# Patient Record
Sex: FEMALE | URBAN - METROPOLITAN AREA
[De-identification: names, ages, dates, MRNs, and addresses within clinical notes are randomized per-mention and may not be internally consistent; named-entity substitution may affect disease eponyms.]

---

## 2022-11-29 ENCOUNTER — OFFICE (OUTPATIENT)
Dept: URBAN - METROPOLITAN AREA CLINIC 122 | Facility: CLINIC | Age: 72
Setting detail: OPHTHALMOLOGY
End: 2022-11-29
Payer: MEDICARE

## 2022-11-29 DIAGNOSIS — H10.45: ICD-10-CM

## 2022-11-29 DIAGNOSIS — H44.23: ICD-10-CM

## 2022-11-29 DIAGNOSIS — H25.13: ICD-10-CM

## 2022-11-29 DIAGNOSIS — H01.002: ICD-10-CM

## 2022-11-29 DIAGNOSIS — H16.222: ICD-10-CM

## 2022-11-29 DIAGNOSIS — H25.013: ICD-10-CM

## 2022-11-29 DIAGNOSIS — H01.005: ICD-10-CM

## 2022-11-29 DIAGNOSIS — H43.393: ICD-10-CM

## 2022-11-29 DIAGNOSIS — H02.89: ICD-10-CM

## 2022-11-29 PROCEDURE — 99213 OFFICE O/P EST LOW 20 MIN: CPT | Performed by: OPHTHALMOLOGY

## 2022-11-29 ASSESSMENT — REFRACTION_CURRENTRX
OS_SPHERE: -16.25
OD_SPHERE: -17.00
OS_AXIS: 100
OD_AXIS: 50
OD_CYLINDER: -1.75
OD_OVR_VA: 20/
OS_CYLINDER: -3.25
OS_OVR_VA: 20/

## 2022-11-29 ASSESSMENT — OVER_REFRACTION
OD_VA1: 20/60-
OD_SPHERE: PLANO
OD_SPHERE: -0.75
OS_SPHERE: -0.75
OS_VA1: 20/50-
OS_SPHERE: PLANO
OD_VA1: 20/70
OS_VA1: 20/70

## 2022-11-29 ASSESSMENT — CORNEAL TRAUMA - FOREIGN BODY
OD_FOREIGNBODY: ABSENT
OS_FOREIGNBODY: ABSENT

## 2022-11-29 ASSESSMENT — LID EXAM ASSESSMENTS
OD_BLEPHARITIS: RLL 2+
OS_TRICHIASIS: LLL 1+
OS_BLEPHARITIS: LLL 2+

## 2022-11-29 ASSESSMENT — SUPERFICIAL PUNCTATE KERATITIS (SPK): OS_SPK: 1+

## 2022-11-29 ASSESSMENT — TEAR BREAK UP TIME (TBUT): OS_TBUT: 2+

## 2022-11-29 ASSESSMENT — CONFRONTATIONAL VISUAL FIELD TEST (CVF)
OS_FINDINGS: FULL
OD_FINDINGS: FULL

## 2022-11-29 ASSESSMENT — VISUAL ACUITY
OS_BCVA: 20/150
OD_BCVA: 20/50+-

## 2022-11-29 ASSESSMENT — CORNEAL TRAUMA - ABRASION: OS_ABRASION: ABSENT

## 2023-02-17 ENCOUNTER — APPOINTMENT (OUTPATIENT)
Dept: VASCULAR SURGERY | Facility: CLINIC | Age: 73
End: 2023-02-17
Payer: MEDICARE

## 2023-02-17 VITALS
HEIGHT: 59 IN | HEART RATE: 76 BPM | DIASTOLIC BLOOD PRESSURE: 75 MMHG | BODY MASS INDEX: 20.36 KG/M2 | OXYGEN SATURATION: 98 % | TEMPERATURE: 97.3 F | SYSTOLIC BLOOD PRESSURE: 142 MMHG | WEIGHT: 101 LBS | RESPIRATION RATE: 16 BRPM

## 2023-02-17 PROBLEM — Z00.00 ENCOUNTER FOR PREVENTIVE HEALTH EXAMINATION: Status: ACTIVE | Noted: 2023-02-17

## 2023-02-17 PROCEDURE — 99204 OFFICE O/P NEW MOD 45 MIN: CPT

## 2023-02-20 RX ORDER — STRONTIUM CHLORIDE HEXAHYDRATE 100 %
CRYSTALS MISCELLANEOUS
Refills: 0 | Status: ACTIVE | COMMUNITY

## 2023-02-20 RX ORDER — GLUCOSAMINE/MSM/CHONDROIT SULF 500-166.6
20 TABLET ORAL
Refills: 0 | Status: ACTIVE | COMMUNITY

## 2023-02-20 RX ORDER — ACETAMINOPHEN 325 MG
TABLET ORAL
Refills: 0 | Status: ACTIVE | COMMUNITY

## 2023-02-20 RX ORDER — MULTIVIT-MIN/FOLIC/VIT K/LYCOP 400-300MCG
1000 TABLET ORAL
Refills: 0 | Status: ACTIVE | COMMUNITY

## 2023-02-20 NOTE — HISTORY OF PRESENT ILLNESS
[FreeTextEntry1] : 72 yo female presents to the office for a chief complaint of pain in her left lower extremity associated with varicose veins. She reports that she is s/p multiple venous procedures by Dr. Harris. She reports that she underwent a venous duplex in his office and was scheduled to undergo an additional venous procedure. She presents for a 2nd opinion. She denies a history of DVT or SVT.

## 2023-02-20 NOTE — PHYSICAL EXAM
[JVD] : no jugular venous distention  [Normal Breath Sounds] : Normal breath sounds [Normal Rate and Rhythm] : normal rate and rhythm [2+] : left 2+ [Ankle Swelling (On Exam)] : present [Ankle Swelling On The Right] : mild [Varicose Veins Of Lower Extremities] : present [Varicose Veins Of The Left Leg] : of the left leg [Ankle Swelling On The Left] : moderate [] : bilaterally [Alert] : alert [Oriented to Person] : oriented to person [Oriented to Place] : oriented to place [Oriented to Time] : oriented to time [de-identified] : Awake and Alert [de-identified] : varicose veins left thigh > 3mm [de-identified] : Appropriate

## 2023-02-20 NOTE — ASSESSMENT
[FreeTextEntry1] : 72 yo female with a history of venous insufficiency. She continues to have symptomatic varicose veins of the left medial thigh. She underwent a venous duplex which demonstrated venous insufficiency of her remnant GSV. I agree with Dr. Harris that she is an excellent candidate to undergo a Varithena ablation of her remnant GSV. I discussed the risks and benefits with the patient. She will contact the office if she would like to porceed.

## 2023-05-23 ENCOUNTER — OFFICE (OUTPATIENT)
Dept: URBAN - METROPOLITAN AREA CLINIC 122 | Facility: CLINIC | Age: 73
Setting detail: OPHTHALMOLOGY
End: 2023-05-23
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
## 2023-05-23 PROCEDURE — NO SHOW FE NO SHOW FEE: Performed by: OPHTHALMOLOGY

## 2023-08-04 ENCOUNTER — APPOINTMENT (OUTPATIENT)
Dept: VASCULAR SURGERY | Facility: CLINIC | Age: 73
End: 2023-08-04
Payer: MEDICARE

## 2023-08-04 VITALS
SYSTOLIC BLOOD PRESSURE: 120 MMHG | BODY MASS INDEX: 19.76 KG/M2 | HEIGHT: 59 IN | OXYGEN SATURATION: 94 % | RESPIRATION RATE: 15 BRPM | DIASTOLIC BLOOD PRESSURE: 70 MMHG | WEIGHT: 98 LBS | HEART RATE: 63 BPM

## 2023-08-04 PROCEDURE — 99213 OFFICE O/P EST LOW 20 MIN: CPT

## 2023-08-04 NOTE — ASSESSMENT
[FreeTextEntry1] : 72 yo female with a history of venous insufficiency. She has worsening symptomatic varicose veins of the left medial thigh. I recommended that she undergo a repeat venous duplex. She will follow up when the results of the duplex are available. Further treatment will depend on the results of the duplex.

## 2023-08-04 NOTE — PHYSICAL EXAM
[JVD] : no jugular venous distention  [Normal Breath Sounds] : Normal breath sounds [Normal Rate and Rhythm] : normal rate and rhythm [2+] : left 2+ [Ankle Swelling (On Exam)] : present [Ankle Swelling On The Right] : mild [Varicose Veins Of Lower Extremities] : present [Varicose Veins Of The Left Leg] : of the left leg [Ankle Swelling On The Left] : moderate [] : bilaterally [Alert] : alert [Oriented to Person] : oriented to person [Oriented to Place] : oriented to place [Oriented to Time] : oriented to time [de-identified] : Awake and Alert [de-identified] : Appropriate [de-identified] : varicose veins left thigh > 3mm

## 2023-08-04 NOTE — HISTORY OF PRESENT ILLNESS
[FreeTextEntry1] : 74 yo female presents to the office for a chief complaint of pain in her left lower extremity associated with varicose veins. She reports that she is s/p multiple venous procedures by Dr. Harris. She reports that she underwent a venous duplex in his office and was scheduled to undergo an additional venous procedure. She presents for a 2nd opinion. She denies a history of DVT or SVT.  [de-identified] : 72 yo female returns in follow up for a chief complaint of pain in her left lower extremity associated with varicose veins. She  is s/p multiple venous procedures by Dr. Harris. She underwent a venous duplex in his office and was scheduled to undergo an additional venous procedure. She was seen for a second opinion and I agreed with his assessment. At that time she did not feel like her symptoms were significant enough to undergo a repeat procedure. She reports worsening symptoms and would like to discuss treatment options.

## 2023-08-11 ENCOUNTER — APPOINTMENT (OUTPATIENT)
Dept: HEART AND VASCULAR | Facility: CLINIC | Age: 73
End: 2023-08-11
Payer: MEDICARE

## 2023-08-11 PROCEDURE — 93971 EXTREMITY STUDY: CPT

## 2023-08-31 ENCOUNTER — APPOINTMENT (OUTPATIENT)
Dept: DERMATOLOGY | Facility: CLINIC | Age: 73
End: 2023-08-31

## 2023-10-06 ENCOUNTER — APPOINTMENT (OUTPATIENT)
Dept: VASCULAR SURGERY | Facility: CLINIC | Age: 73
End: 2023-10-06
Payer: MEDICARE

## 2023-10-06 VITALS
RESPIRATION RATE: 16 BRPM | HEIGHT: 59 IN | WEIGHT: 98 LBS | DIASTOLIC BLOOD PRESSURE: 80 MMHG | OXYGEN SATURATION: 98 % | SYSTOLIC BLOOD PRESSURE: 120 MMHG | HEART RATE: 68 BPM | BODY MASS INDEX: 19.76 KG/M2

## 2023-10-06 DIAGNOSIS — I87.2 VENOUS INSUFFICIENCY (CHRONIC) (PERIPHERAL): ICD-10-CM

## 2023-10-06 DIAGNOSIS — I83.90 ASYMPTOMATIC VARICOSE VEINS OF UNSPECIFIED LOWER EXTREMITY: ICD-10-CM

## 2023-10-06 PROCEDURE — 99213 OFFICE O/P EST LOW 20 MIN: CPT

## 2023-10-24 ENCOUNTER — OFFICE (OUTPATIENT)
Dept: URBAN - METROPOLITAN AREA CLINIC 122 | Facility: CLINIC | Age: 73
Setting detail: OPHTHALMOLOGY
End: 2023-10-24
Payer: MEDICARE

## 2023-10-24 DIAGNOSIS — H01.005: ICD-10-CM

## 2023-10-24 DIAGNOSIS — H44.23: ICD-10-CM

## 2023-10-24 DIAGNOSIS — H02.015: ICD-10-CM

## 2023-10-24 DIAGNOSIS — H01.002: ICD-10-CM

## 2023-10-24 DIAGNOSIS — H25.13: ICD-10-CM

## 2023-10-24 PROCEDURE — 67820 REVISE EYELASHES: CPT | Mod: E2 | Performed by: OPHTHALMOLOGY

## 2023-10-24 PROCEDURE — 92250 FUNDUS PHOTOGRAPHY W/I&R: CPT | Performed by: OPHTHALMOLOGY

## 2023-10-24 PROCEDURE — 92014 COMPRE OPH EXAM EST PT 1/>: CPT | Mod: 25 | Performed by: OPHTHALMOLOGY

## 2023-10-24 ASSESSMENT — OVER_REFRACTION
OD_SPHERE: PLANO
OD_SPHERE: -0.75
OS_SPHERE: PLANO
OD_VA1: 20/70
OS_VA1: 20/70
OD_VA1: 20/200
OS_VA1: 20/60
OS_SPHERE: -0.75

## 2023-10-24 ASSESSMENT — TEAR BREAK UP TIME (TBUT): OS_TBUT: 2+

## 2023-10-24 ASSESSMENT — LID EXAM ASSESSMENTS
OD_BLEPHARITIS: RLL 2+
OS_TRICHIASIS: LLL 1+
OS_BLEPHARITIS: LLL 2+

## 2023-10-24 ASSESSMENT — VISUAL ACUITY
OD_BCVA: 20/60
OS_BCVA: 20/200

## 2023-10-24 ASSESSMENT — REFRACTION_CURRENTRX
OD_OVR_VA: 20/
OD_SPHERE: -17.00
OD_CYLINDER: -1.75
OS_OVR_VA: 20/
OS_CYLINDER: -3.25
OS_VPRISM_DIRECTION: SV
OS_SPHERE: -16.25
OS_AXIS: 100
OD_AXIS: 50
OD_VPRISM_DIRECTION: SV

## 2023-10-24 ASSESSMENT — CORNEAL TRAUMA - ABRASION: OS_ABRASION: ABSENT

## 2023-10-24 ASSESSMENT — CORNEAL TRAUMA - FOREIGN BODY
OD_FOREIGNBODY: ABSENT
OS_FOREIGNBODY: ABSENT

## 2023-10-24 ASSESSMENT — SUPERFICIAL PUNCTATE KERATITIS (SPK): OS_SPK: 1+

## 2024-05-17 ENCOUNTER — APPOINTMENT (OUTPATIENT)
Dept: VASCULAR SURGERY | Facility: CLINIC | Age: 74
End: 2024-05-17

## 2024-08-29 ENCOUNTER — NON-APPOINTMENT (OUTPATIENT)
Age: 74
End: 2024-08-29

## 2024-08-30 ENCOUNTER — APPOINTMENT (OUTPATIENT)
Dept: VASCULAR SURGERY | Facility: CLINIC | Age: 74
End: 2024-08-30
Payer: MEDICARE

## 2024-08-30 VITALS — HEIGHT: 59 IN | BODY MASS INDEX: 20.36 KG/M2 | WEIGHT: 101 LBS

## 2024-08-30 DIAGNOSIS — I83.90 ASYMPTOMATIC VARICOSE VEINS OF UNSPECIFIED LOWER EXTREMITY: ICD-10-CM

## 2024-08-30 PROCEDURE — 99214 OFFICE O/P EST MOD 30 MIN: CPT

## 2024-08-30 NOTE — PHYSICAL EXAM
[JVD] : no jugular venous distention  [Normal Breath Sounds] : Normal breath sounds [Normal Rate and Rhythm] : normal rate and rhythm [2+] : left 2+ [Ankle Swelling (On Exam)] : present [Ankle Swelling On The Right] : mild [Varicose Veins Of Lower Extremities] : present [Varicose Veins Of The Left Leg] : of the left leg [Ankle Swelling On The Left] : moderate [] : bilaterally [Alert] : alert [Oriented to Person] : oriented to person [Oriented to Place] : oriented to place [Oriented to Time] : oriented to time [de-identified] : Awake and Alert [de-identified] : varicose veins left thigh and calf > 3mm [de-identified] : Appropriate

## 2024-08-30 NOTE — ASSESSMENT
[FreeTextEntry1] : 72 yo female with a history of venous insufficiency. She has worsening symptomatic varicose veins of the left medial thigh and calf.  She underwent a repeat venous duplex which demonstrated an ablated thigh greater saphenous vein.  She has significant venous reflux in the remnant greater saphenous vein.  She is an excellent candidate for Varithena ablation of the remnant greater saphenous vein.  The risks and benefits of the procedure including DVT were discussed with the patient in detail.  She agrees to proceed.  She will continue to wear compression socks until the time of the procedure.

## 2024-08-30 NOTE — HISTORY OF PRESENT ILLNESS
[FreeTextEntry1] : 74 yo female presents to the office for a chief complaint of pain in her left lower extremity associated with varicose veins. She reports that she is s/p multiple venous procedures by Dr. Harris. She reports that she underwent a venous duplex in his office and was scheduled to undergo an additional venous procedure. She presents for a 2nd opinion. She denies a history of DVT or SVT.  [de-identified] : 74 yo female returns in follow up for a chief complaint of pain in her left lower extremity associated with varicose veins. She  is s/p multiple venous procedures by Dr. Harris. She underwent a venous duplex in his office and was scheduled to undergo an additional venous procedure. She was seen for a second opinion and I agreed with his assessment. At that time she did not feel like her symptoms were significant enough to undergo a repeat procedure. She reports worsening symptoms and underwent a venous duplex. She presents to discuss the results and additional treatment options.

## 2024-09-16 ENCOUNTER — APPOINTMENT (OUTPATIENT)
Dept: VASCULAR SURGERY | Facility: CLINIC | Age: 74
End: 2024-09-16

## 2024-09-25 ENCOUNTER — APPOINTMENT (OUTPATIENT)
Dept: VASCULAR SURGERY | Facility: CLINIC | Age: 74
End: 2024-09-25

## 2024-10-02 ENCOUNTER — APPOINTMENT (OUTPATIENT)
Dept: VASCULAR SURGERY | Facility: CLINIC | Age: 74
End: 2024-10-02

## 2024-10-28 ENCOUNTER — APPOINTMENT (OUTPATIENT)
Dept: VASCULAR SURGERY | Facility: CLINIC | Age: 74
End: 2024-10-28

## 2024-11-06 ENCOUNTER — OFFICE (OUTPATIENT)
Dept: URBAN - METROPOLITAN AREA CLINIC 122 | Facility: CLINIC | Age: 74
Setting detail: OPHTHALMOLOGY
End: 2024-11-06
Payer: MEDICARE

## 2024-11-06 DIAGNOSIS — H44.23: ICD-10-CM

## 2024-11-06 DIAGNOSIS — H25.13: ICD-10-CM

## 2024-11-06 DIAGNOSIS — H01.005: ICD-10-CM

## 2024-11-06 DIAGNOSIS — H16.222: ICD-10-CM

## 2024-11-06 DIAGNOSIS — H25.013: ICD-10-CM

## 2024-11-06 DIAGNOSIS — H43.393: ICD-10-CM

## 2024-11-06 DIAGNOSIS — H01.002: ICD-10-CM

## 2024-11-06 DIAGNOSIS — H10.45: ICD-10-CM

## 2024-11-06 DIAGNOSIS — H02.015: ICD-10-CM

## 2024-11-06 PROCEDURE — 92134 CPTRZ OPH DX IMG PST SGM RTA: CPT | Performed by: OPHTHALMOLOGY

## 2024-11-06 PROCEDURE — 92014 COMPRE OPH EXAM EST PT 1/>: CPT | Performed by: OPHTHALMOLOGY

## 2024-11-06 ASSESSMENT — OVER_REFRACTION
OD_SPHERE: -0.75
OD_SPHERE: PLANO
OS_SPHERE: -0.75
OD_VA1: 20/70
OD_VA1: 20/200
OS_SPHERE: PLANO
OS_VA1: 20/60
OS_VA1: 20/70

## 2024-11-06 ASSESSMENT — REFRACTION_CURRENTRX
OD_OVR_VA: 20/
OS_CYLINDER: -3.25
OS_VPRISM_DIRECTION: SV
OS_OVR_VA: 20/
OS_AXIS: 100
OD_AXIS: 50
OD_CYLINDER: -1.75
OD_SPHERE: -17.00
OD_VPRISM_DIRECTION: SV
OS_SPHERE: -16.25

## 2024-11-06 ASSESSMENT — VISUAL ACUITY
OS_BCVA: 20/200
OD_BCVA: 20/60-2

## 2024-11-06 ASSESSMENT — CONFRONTATIONAL VISUAL FIELD TEST (CVF)
OS_FINDINGS: FULL
OD_FINDINGS: FULL

## 2024-11-06 ASSESSMENT — LID EXAM ASSESSMENTS
OD_BLEPHARITIS: RLL 2+
OS_BLEPHARITIS: LLL 2+
OS_TRICHIASIS: LLL 1+

## 2024-11-06 ASSESSMENT — CORNEAL TRAUMA - FOREIGN BODY
OS_FOREIGNBODY: ABSENT
OD_FOREIGNBODY: ABSENT

## 2024-11-06 ASSESSMENT — TONOMETRY
OS_IOP_MMHG: 16
OD_IOP_MMHG: 16

## 2024-11-06 ASSESSMENT — SUPERFICIAL PUNCTATE KERATITIS (SPK): OS_SPK: 1+

## 2024-11-06 ASSESSMENT — CORNEAL TRAUMA - ABRASION: OS_ABRASION: ABSENT

## 2024-11-06 ASSESSMENT — TEAR BREAK UP TIME (TBUT): OS_TBUT: 2+

## 2025-08-18 ENCOUNTER — APPOINTMENT (OUTPATIENT)
Dept: VASCULAR SURGERY | Facility: CLINIC | Age: 75
End: 2025-08-18
Payer: MEDICARE

## 2025-08-18 VITALS — HEIGHT: 59 IN | WEIGHT: 103 LBS | BODY MASS INDEX: 20.76 KG/M2

## 2025-08-18 DIAGNOSIS — I87.2 VENOUS INSUFFICIENCY (CHRONIC) (PERIPHERAL): ICD-10-CM

## 2025-08-18 DIAGNOSIS — I83.90 ASYMPTOMATIC VARICOSE VEINS OF UNSPECIFIED LOWER EXTREMITY: ICD-10-CM

## 2025-08-18 PROCEDURE — 99214 OFFICE O/P EST MOD 30 MIN: CPT

## 2025-08-25 ENCOUNTER — OFFICE (OUTPATIENT)
Dept: URBAN - METROPOLITAN AREA CLINIC 122 | Facility: CLINIC | Age: 75
Setting detail: OPHTHALMOLOGY
End: 2025-08-25
Payer: MEDICARE

## 2025-08-25 DIAGNOSIS — H01.005: ICD-10-CM

## 2025-08-25 DIAGNOSIS — H01.002: ICD-10-CM

## 2025-08-25 DIAGNOSIS — H25.13: ICD-10-CM

## 2025-08-25 DIAGNOSIS — H44.23: ICD-10-CM

## 2025-08-25 DIAGNOSIS — H02.015: ICD-10-CM

## 2025-08-25 PROCEDURE — 92014 COMPRE OPH EXAM EST PT 1/>: CPT | Mod: 25 | Performed by: OPHTHALMOLOGY

## 2025-08-25 PROCEDURE — 67820 REVISE EYELASHES: CPT | Mod: LT | Performed by: OPHTHALMOLOGY

## 2025-08-25 PROCEDURE — 92250 FUNDUS PHOTOGRAPHY W/I&R: CPT | Performed by: OPHTHALMOLOGY

## 2025-08-25 ASSESSMENT — REFRACTION_AUTOREFRACTION
OS_AXIS: 125
OD_AXIS: 44
OD_SPHERE: -22.75
OS_CYLINDER: -4.00
OD_CYLINDER: -4.00
OS_SPHERE: -17.50

## 2025-08-25 ASSESSMENT — SUPERFICIAL PUNCTATE KERATITIS (SPK): OS_SPK: 1+

## 2025-08-25 ASSESSMENT — OVER_REFRACTION
OD_VA1: 20/200
OS_VA1: 20/70
OS_VA1: 20/60
OD_SPHERE: PLANO
OS_SPHERE: -0.75
OS_SPHERE: PLANO
OD_SPHERE: -0.75
OD_VA1: 20/70

## 2025-08-25 ASSESSMENT — REFRACTION_CURRENTRX
OS_AXIS: 100
OD_SPHERE: -17.00
OD_OVR_VA: 20/
OD_VPRISM_DIRECTION: SV
OD_CYLINDER: -1.75
OS_OVR_VA: 20/
OS_CYLINDER: -3.25
OS_SPHERE: -16.25
OS_VPRISM_DIRECTION: SV
OD_AXIS: 50

## 2025-08-25 ASSESSMENT — TONOMETRY
OD_IOP_MMHG: 14
OS_IOP_MMHG: 15

## 2025-08-25 ASSESSMENT — CONFRONTATIONAL VISUAL FIELD TEST (CVF)
OS_FINDINGS: FULL
OD_FINDINGS: FULL

## 2025-08-25 ASSESSMENT — LID EXAM ASSESSMENTS
OS_BLEPHARITIS: LLL 2+
OS_TRICHIASIS: LLL 1+
OD_BLEPHARITIS: RLL 2+

## 2025-08-25 ASSESSMENT — TEAR BREAK UP TIME (TBUT): OS_TBUT: 2+

## 2025-08-25 ASSESSMENT — CORNEAL TRAUMA - ABRASION: OS_ABRASION: ABSENT

## 2025-08-25 ASSESSMENT — VISUAL ACUITY
OS_BCVA: 20/200
OD_BCVA: 20/60

## 2025-08-25 ASSESSMENT — CORNEAL TRAUMA - FOREIGN BODY
OD_FOREIGNBODY: ABSENT
OS_FOREIGNBODY: ABSENT

## 2025-09-04 ENCOUNTER — APPOINTMENT (OUTPATIENT)
Dept: VASCULAR SURGERY | Facility: CLINIC | Age: 75
End: 2025-09-04

## 2025-09-04 PROCEDURE — 93970 EXTREMITY STUDY: CPT
